# Patient Record
Sex: FEMALE | Race: WHITE | URBAN - METROPOLITAN AREA
[De-identification: names, ages, dates, MRNs, and addresses within clinical notes are randomized per-mention and may not be internally consistent; named-entity substitution may affect disease eponyms.]

---

## 2017-09-26 ENCOUNTER — APPOINTMENT (RX ONLY)
Dept: URBAN - METROPOLITAN AREA CLINIC 36 | Facility: CLINIC | Age: 82
Setting detail: DERMATOLOGY
End: 2017-09-26

## 2017-09-26 DIAGNOSIS — L84 CORNS AND CALLOSITIES: ICD-10-CM

## 2017-09-26 DIAGNOSIS — L60.8 OTHER NAIL DISORDERS: ICD-10-CM

## 2017-09-26 PROBLEM — L85.3 XEROSIS CUTIS: Status: ACTIVE | Noted: 2017-09-26

## 2017-09-26 PROCEDURE — 99202 OFFICE O/P NEW SF 15 MIN: CPT

## 2017-09-26 RX ORDER — UREA 470 MG/G
CREAM TOPICAL
Qty: 1 | Refills: 9 | Status: ERX | COMMUNITY
Start: 2017-09-26

## 2017-09-26 RX ADMIN — UREA: 470 CREAM TOPICAL at 14:00

## 2017-09-26 NOTE — HPI: SKIN LESION
How Severe Is Your Skin Lesion?: moderate
Has Your Skin Lesion Been Treated?: not been treated
Is This A New Presentation, Or A Follow-Up?: Skin Lesions
Additional History: They been shaved before, but keep coming back.